# Patient Record
Sex: FEMALE | Race: ASIAN | NOT HISPANIC OR LATINO
[De-identification: names, ages, dates, MRNs, and addresses within clinical notes are randomized per-mention and may not be internally consistent; named-entity substitution may affect disease eponyms.]

---

## 2023-05-05 ENCOUNTER — NON-APPOINTMENT (OUTPATIENT)
Age: 38
End: 2023-05-05

## 2023-05-05 ENCOUNTER — APPOINTMENT (OUTPATIENT)
Dept: CARDIOLOGY | Facility: CLINIC | Age: 38
End: 2023-05-05
Payer: MEDICAID

## 2023-05-05 VITALS
RESPIRATION RATE: 15 BRPM | OXYGEN SATURATION: 100 % | TEMPERATURE: 98.5 F | BODY MASS INDEX: 22.37 KG/M2 | HEIGHT: 63 IN | WEIGHT: 126.25 LBS | SYSTOLIC BLOOD PRESSURE: 103 MMHG | DIASTOLIC BLOOD PRESSURE: 65 MMHG | HEART RATE: 67 BPM

## 2023-05-05 DIAGNOSIS — R55 SYNCOPE AND COLLAPSE: ICD-10-CM

## 2023-05-05 DIAGNOSIS — R00.2 PALPITATIONS: ICD-10-CM

## 2023-05-05 PROCEDURE — 93000 ELECTROCARDIOGRAM COMPLETE: CPT

## 2023-05-05 PROCEDURE — 99204 OFFICE O/P NEW MOD 45 MIN: CPT | Mod: 25

## 2023-05-05 NOTE — DISCUSSION/SUMMARY
[FreeTextEntry1] : 37F presents to establish cv care\par \par pt states her pmd sent her in for fluid around the heart\par unclear on further details\par pt does endorse hx of palpitations with syncope and diaphoresis\par EKG showing SR today\par last syncopal episode was 2.5 months ago\par will check tte to eval for structural heart dz\par will check exercise tolerance test\par \par f/u after initial testing\par >45 min spent on complete encounter [EKG obtained to assist in diagnosis and management of assessed problem(s)] : EKG obtained to assist in diagnosis and management of assessed problem(s)

## 2023-05-05 NOTE — REVIEW OF SYSTEMS
[Fever] : no fever [Headache] : no headache [Chills] : no chills [Feeling Fatigued] : not feeling fatigued [Weight Loss (___ Lbs)] : no recent weight loss [Blurry Vision] : no blurred vision [Sore Throat] : no sore throat [SOB] : no shortness of breath [Dyspnea on exertion] : not dyspnea during exertion [Chest Discomfort] : no chest discomfort [Lower Ext Edema] : no extremity edema [Palpitations] : no palpitations [Orthopnea] : no orthopnea [Syncope] : no syncope [Cough] : no cough [Wheezing] : no wheezing [Vomiting] : no vomiting [Dizziness] : no dizziness [Confusion] : no confusion was observed [Easy Bleeding] : no tendency for easy bleeding [Easy Bruising] : no tendency for easy bruising

## 2023-05-05 NOTE — HISTORY OF PRESENT ILLNESS
[FreeTextEntry1] : 37F presents to establish care\par Sent in by PMD: Dr elizabeth [facility] \par \par pt speaks carli \par \par pt's  present for complete encounter. \par \par pt sent in for baseline cv eval. \par \par denies CP, SOB, at rest. does endorse mild AGUILERA. pt endorses occasional palpitations, states she has had syncopal episodes with loc and wakes up with diaphoresis. \par last syncopal episode was 2.5 months ago. \par \par Exercise: none\par Diet: none\par \par Prev cardiac history: none\par Previous cardiac testing: none\par Recent labs:\par \par \par EKG: SR 66\par \par \par Med hx: none\par OBGYN hx:  3 children.  No Hx of gestational DM, gestational HTN, preeclampsia.  no Hx of miscarriage. Currently with regular menstrual cycles\par Sx hx: c/s x2	\par Family hx: M: CVA. F: CVA\par Social hx:  lives in [place]. works as  in store. no tob/etoh/drugs\par Meds: none\par Allergies: nkda \par \par

## 2023-06-27 ENCOUNTER — APPOINTMENT (OUTPATIENT)
Dept: CARDIOLOGY | Facility: CLINIC | Age: 38
End: 2023-06-27
Payer: MEDICAID

## 2023-06-27 VITALS — HEART RATE: 69 BPM | DIASTOLIC BLOOD PRESSURE: 68 MMHG | SYSTOLIC BLOOD PRESSURE: 105 MMHG

## 2023-06-27 PROCEDURE — 93015 CV STRESS TEST SUPVJ I&R: CPT

## 2023-06-27 PROCEDURE — ZZZZZ: CPT

## 2023-06-27 PROCEDURE — 99213 OFFICE O/P EST LOW 20 MIN: CPT | Mod: 25

## 2023-06-27 PROCEDURE — 93306 TTE W/DOPPLER COMPLETE: CPT

## 2023-12-27 ENCOUNTER — APPOINTMENT (OUTPATIENT)
Dept: CARDIOLOGY | Facility: CLINIC | Age: 38
End: 2023-12-27